# Patient Record
Sex: FEMALE | Race: WHITE | NOT HISPANIC OR LATINO | URBAN - METROPOLITAN AREA
[De-identification: names, ages, dates, MRNs, and addresses within clinical notes are randomized per-mention and may not be internally consistent; named-entity substitution may affect disease eponyms.]

---

## 2017-05-18 ENCOUNTER — IMPORTED ENCOUNTER (OUTPATIENT)
Dept: URBAN - METROPOLITAN AREA CLINIC 1 | Facility: CLINIC | Age: 72
End: 2017-05-18

## 2017-05-18 PROBLEM — H43.813: Noted: 2017-05-18

## 2017-05-18 PROBLEM — H31.092: Noted: 2017-05-18

## 2017-05-18 PROBLEM — H16.143: Noted: 2017-05-18

## 2017-05-18 PROBLEM — H25.813: Noted: 2017-05-18

## 2017-05-18 PROBLEM — H04.123: Noted: 2017-05-18

## 2017-05-18 PROCEDURE — 92014 COMPRE OPH EXAM EST PT 1/>: CPT

## 2017-05-18 NOTE — PATIENT DISCUSSION
1.  Cataract OU: Observe for now without intervention. The patient was advised to contact us if any change or worsening of vision2. LOREE w/ PEK OU- Cont ATs TID OU routinely. 3.  PVD OU - RD precautions. 4.  Chorioretinal Scar OS - stable observe 5. H/o LASIK OD (Possibly by Dr. Dax Ruiz?) Will again sign records release today prior to leaving (did not receive records from Dr. Dax Ruiz)  Letter to [address] for an appointment in 1 year 27 with Dr. Rose Daigle.

## 2018-05-21 ENCOUNTER — IMPORTED ENCOUNTER (OUTPATIENT)
Dept: URBAN - METROPOLITAN AREA CLINIC 1 | Facility: CLINIC | Age: 73
End: 2018-05-21

## 2018-05-21 PROBLEM — H21.89: Noted: 2018-05-21

## 2018-05-21 PROBLEM — H25.813: Noted: 2018-05-21

## 2018-05-21 PROCEDURE — 92250 FUNDUS PHOTOGRAPHY W/I&R: CPT

## 2018-05-21 PROCEDURE — 92014 COMPRE OPH EXAM EST PT 1/>: CPT

## 2018-05-21 NOTE — PATIENT DISCUSSION
1.  Cataract OU: Observe for now without intervention. The patient was advised to contact us if any change or worsening of vision2. Iris Nevus OD- External Photo today shows Iris Nevus OD. Will check patient every 6 months due to Iris Nevus. 3.  LOREE w/ PEK OU- Cont ATs TID OU routinely. 4.  PVD OU - RD precautions. 5.  Chorioretinal Scar OS - stable observe 6. H/o LASIK OD (Possibly by Dr. Georgina Montanez?) Will again sign records release today prior to leaving (did not receive records from Dr. Georgina Montanez again this visit) Would like to try and obtain old ocular records regarding previous LASIK OD. Letter to [address] for an appointment in 6 mo 10 (check Nevus OD) with Dr. Maura Jain.

## 2018-07-24 ENCOUNTER — IMPORTED ENCOUNTER (OUTPATIENT)
Dept: URBAN - METROPOLITAN AREA CLINIC 1 | Facility: CLINIC | Age: 73
End: 2018-07-24

## 2018-07-24 PROCEDURE — 92012 INTRM OPH EXAM EST PATIENT: CPT

## 2018-07-24 NOTE — PATIENT DISCUSSION
1.  Iris Nevus OD- Comparing to today's slitlamp to last exam's anterior photos I do not see any signs of progression. Reassurance2. Return for an appointment for Return as scheduled with Dr. Claudeen Cobble.

## 2018-12-13 ENCOUNTER — IMPORTED ENCOUNTER (OUTPATIENT)
Dept: URBAN - METROPOLITAN AREA CLINIC 1 | Facility: CLINIC | Age: 73
End: 2018-12-13

## 2018-12-13 PROBLEM — H16.143: Noted: 2018-12-13

## 2018-12-13 PROBLEM — H04.122: Noted: 2018-12-13

## 2018-12-13 PROBLEM — H04.123: Noted: 2018-12-13

## 2018-12-13 PROBLEM — H43.813: Noted: 2018-12-13

## 2018-12-13 PROBLEM — H16.142: Noted: 2018-12-13

## 2018-12-13 PROBLEM — H31.092: Noted: 2018-12-13

## 2018-12-13 PROBLEM — H25.813: Noted: 2018-12-13

## 2018-12-13 PROCEDURE — 92015 DETERMINE REFRACTIVE STATE: CPT

## 2018-12-13 PROCEDURE — 99213 OFFICE O/P EST LOW 20 MIN: CPT

## 2018-12-13 NOTE — PATIENT DISCUSSION
1.  Cataract OU: Observe for now without intervention. The patient was advised to contact us if any change or worsening of vision2. LOREE w/ PEK OU- Stable. The continuation of artificial tears were recommended. 3.  PVD OU- Old stable. 4.  Chorioretinal Scars OS- Stable. Observe. 5. Iris Nevus OD- Stable. Observe. 6. H/o LASIK OD (Possibly by Dr. Jorge Sandhu?) - unable to obtain old ocular records regarding previous LASIK OD. 7.  Return for an appointment for 30/glare in 6 months with Dr. Zachariah Das.

## 2019-06-13 ENCOUNTER — IMPORTED ENCOUNTER (OUTPATIENT)
Dept: URBAN - METROPOLITAN AREA CLINIC 1 | Facility: CLINIC | Age: 74
End: 2019-06-13

## 2019-06-13 PROBLEM — H16.143: Noted: 2019-06-13

## 2019-06-13 PROBLEM — H43.813: Noted: 2019-06-13

## 2019-06-13 PROBLEM — H04.123: Noted: 2019-06-13

## 2019-06-13 PROBLEM — H25.813: Noted: 2019-06-13

## 2019-06-13 PROCEDURE — 92014 COMPRE OPH EXAM EST PT 1/>: CPT

## 2019-06-13 NOTE — PATIENT DISCUSSION
1.  Cataract OU -- Visually significant secondary to glare OU however no va complaint from patient. Continue to observe for now without intervention. The patient was advised to contact us if any change or worsening of vision2. LOREE w/ inceased PEK OU -- Progression. Stressed compliance with ATs. Recommend ATs BID to TID OU. 3. PVD OU -- Old stable. Rd precautions4. Chorioretinal Scars OS- Stable. Observe. 5. Iris Nevus OD- Stable. Observe. 6. H/o LASIK OD (Possibly by Dr. Luis Rosario?) - unable to obtain old ocular records regarding previous LASIK OD. Patient defers Glasses MRx today. Return for an appointment in 1 year for a 30 glare with Dr. Jaciel Sloan.

## 2019-09-09 ENCOUNTER — IMPORTED ENCOUNTER (OUTPATIENT)
Dept: URBAN - METROPOLITAN AREA CLINIC 1 | Facility: CLINIC | Age: 74
End: 2019-09-09

## 2019-09-09 PROBLEM — H16.143: Noted: 2019-09-09

## 2019-09-09 PROBLEM — H25.813: Noted: 2019-09-09

## 2019-09-09 PROBLEM — H04.123: Noted: 2019-09-09

## 2019-09-09 PROCEDURE — 92012 INTRM OPH EXAM EST PATIENT: CPT

## 2019-09-09 PROCEDURE — 92015 DETERMINE REFRACTIVE STATE: CPT

## 2019-09-09 NOTE — PATIENT DISCUSSION
1.  Cataract OU:  Visually Significant secondary to glare discussed the risks benefits alternatives and limitations of cataract surgery. The patient stated a full understanding and a desire to proceed with the procedure. The patient will need to return for preop appointment with cataract measurements and to have any additional questions answered and start pre-operative eye drops as directed. Phaco PCL OS then OD. (Otherwise follow-up 6 mo 10 dfe glare) 2. LOREE w/ inceased PEK OU -- Cont ATs TID OU. 3. PVD OU -- stable. RD precautions4. Chorioretinal Scars OS- Stable. Observe. 5. Iris Nevus OD- Stable. Observe. 6. H/o LASIK OD (Possibly by Dr. Elías Rodriguez?) Myopic LASIK? - unable to obtain old ocular records regarding previous LASIK OD. Schedule Phaco PCL OS then OD.

## 2019-09-10 ENCOUNTER — IMPORTED ENCOUNTER (OUTPATIENT)
Dept: URBAN - METROPOLITAN AREA CLINIC 1 | Facility: CLINIC | Age: 74
End: 2019-09-10

## 2019-09-10 PROBLEM — H25.812: Noted: 2019-09-10

## 2019-09-10 PROCEDURE — 92136 OPHTHALMIC BIOMETRY: CPT

## 2019-09-10 NOTE — PATIENT DISCUSSION
1. Cataract OS:  Visually Significant secondary to glare discussed the risks benefits alternatives and limitations of cataract surgery. The patient stated a full understanding and a desire to proceed with the procedure. Discussed with patient if PO Gtts are more than $120 for all three combined when filling at their Pharmacy please call our office to request generic substitutions. Guarded visual prognosis due to history of refractive surgery. The accuracy of the IOL selection may be affected by prior refractive surgery and patient should expect to wear glasses after Phaco. Thoroughly discussed with patient patient understood. Phaco PCL OS  Lifestyle Questionnaire Completed. Return for an appointment for Return as scheduled with Dr. Sophia Garber.

## 2019-09-18 ENCOUNTER — IMPORTED ENCOUNTER (OUTPATIENT)
Dept: URBAN - METROPOLITAN AREA CLINIC 1 | Facility: CLINIC | Age: 74
End: 2019-09-18

## 2019-09-19 ENCOUNTER — IMPORTED ENCOUNTER (OUTPATIENT)
Dept: URBAN - METROPOLITAN AREA CLINIC 1 | Facility: CLINIC | Age: 74
End: 2019-09-19

## 2019-09-19 PROBLEM — Z96.1: Noted: 2019-09-19

## 2019-09-19 PROCEDURE — 99024 POSTOP FOLLOW-UP VISIT: CPT

## 2019-09-19 NOTE — PATIENT DISCUSSION
POD#1 CE/IOL OS (Standard w/ LenSx) doing well. Use Lotemax BID OS Bromsite Qdaily OS Ocuflox TID OS : Use all three gtts through completion of PO gtt chart regimen/ Per our instructions given to patient.   Post op Warnings Reiterated RTC as scheduled

## 2019-09-26 ENCOUNTER — IMPORTED ENCOUNTER (OUTPATIENT)
Dept: URBAN - METROPOLITAN AREA CLINIC 1 | Facility: CLINIC | Age: 74
End: 2019-09-26

## 2019-09-26 PROBLEM — H25.811: Noted: 2019-09-26

## 2019-09-26 PROCEDURE — 92136 OPHTHALMIC BIOMETRY: CPT

## 2019-09-26 NOTE — PATIENT DISCUSSION
1.  Cataract OD: Visually Significant secondary to glare discussed the risks benefits alternatives and limitations of cataract surgery. The patient stated a full understanding and a desire to proceed with the procedure. Discussed with patient if PO Gtts are more than $120 for all three combined when filling at their Pharmacy please call our office to request generic substitutions. Guarded visual prognosis due to history of refractive surgery. The accuracy of the IOL selection may be affected by prior refractive surgery and patient should expect to wear glasses after Phaco. Thoroughly discussed with patient patient understood. Phaco PCL OD 2. POW#1  CE/IOL OS (Standard w/ LenSx)doing well. Use Lotemax BID OS and Bromsite Qdaily OS: Use gtts through completion of PO gtt regimen.  F/u as scheduled 2nd eye

## 2019-10-02 ENCOUNTER — IMPORTED ENCOUNTER (OUTPATIENT)
Dept: URBAN - METROPOLITAN AREA CLINIC 1 | Facility: CLINIC | Age: 74
End: 2019-10-02

## 2019-10-03 ENCOUNTER — IMPORTED ENCOUNTER (OUTPATIENT)
Dept: URBAN - METROPOLITAN AREA CLINIC 1 | Facility: CLINIC | Age: 74
End: 2019-10-03

## 2019-10-03 PROBLEM — Z96.1: Noted: 2019-10-03

## 2019-10-03 PROCEDURE — 99024 POSTOP FOLLOW-UP VISIT: CPT

## 2019-10-03 NOTE — PATIENT DISCUSSION
POD#1 CE/IOL OD doing well. Use **: Use all three gtts through completion of PO gtt chart regimen/ Per our instructions given to patient.   Post op Warnings Reiterated RTC as scheduled

## 2019-10-03 NOTE — PATIENT DISCUSSION
1. POD#1 Phaco/ PCL OD (Standard w/LenSx)- doing well. Use Lotemax BID OD Bromsite Qdaily OD Ocuflox TID OD : Use all three gtts through completion of PO gtt chart regimen/ Per our instructions given. Post op Warnings Reiterated 2. POW#3 Phaco/ PCL OS(Standard w/LenSx)- doing well  Use Lotemax BID OS and Bromsite Qdaily OS: Use gtts through completion of PO gtt regimen.  RTC as scheduled

## 2019-10-24 ENCOUNTER — IMPORTED ENCOUNTER (OUTPATIENT)
Dept: URBAN - METROPOLITAN AREA CLINIC 1 | Facility: CLINIC | Age: 74
End: 2019-10-24

## 2019-10-24 PROBLEM — Z96.1: Noted: 2019-10-24

## 2019-10-24 PROCEDURE — 99024 POSTOP FOLLOW-UP VISIT: CPT

## 2019-10-24 NOTE — PATIENT DISCUSSION
POM#1 CE/IOL OU (Standard w/ LenSx OU) doing well. Use Lotemax BID OD and Bromsite Qdaily OD: Use gtts through completion of PO gtt regimen. MRX for glasses given. Return for an appointment in June 30 with Dr. Sofia Baumann.

## 2020-06-11 ENCOUNTER — IMPORTED ENCOUNTER (OUTPATIENT)
Dept: URBAN - METROPOLITAN AREA CLINIC 1 | Facility: CLINIC | Age: 75
End: 2020-06-11

## 2020-06-11 PROCEDURE — 92014 COMPRE OPH EXAM EST PT 1/>: CPT

## 2020-06-11 NOTE — PATIENT DISCUSSION
1.  LOREE w/ PEK OU -- Recommend the routine use of ATs QID OU. 2.  PCO OU -- (Posterior Capsule Opacification) Observe and consider yag cap when pt feels pco visually significant and visual acuity decreases to appropriate level. 3. Pseudophakia OU - Standard w/ LenSx OU. 4. Chorioretinal Scars OS - Stable. Observe. 5. PVD OU - RD precautions. 6.  Iris Nevus OD - Stable. 7. H/o LASIK OD (?[place]) unable to obtain old ocular records regarding previous LASIK. Return for an appointment in 1 year for a 30/glare with Dr. Luli Velasco.

## 2020-07-17 PROBLEM — Z96.1: Noted: 2020-07-17

## 2020-07-17 PROBLEM — H43.813: Noted: 2020-07-17

## 2020-07-17 PROBLEM — H26.493: Noted: 2020-07-17

## 2020-07-17 PROBLEM — H04.123: Noted: 2020-06-11

## 2020-07-17 PROBLEM — H16.143: Noted: 2020-06-11

## 2020-07-17 PROBLEM — H31.092: Noted: 2020-07-17

## 2021-06-11 ENCOUNTER — IMPORTED ENCOUNTER (OUTPATIENT)
Dept: URBAN - METROPOLITAN AREA CLINIC 1 | Facility: CLINIC | Age: 76
End: 2021-06-11

## 2021-06-11 PROBLEM — H16.143: Noted: 2021-06-11

## 2021-06-11 PROBLEM — Z96.1: Noted: 2021-06-11

## 2021-06-11 PROBLEM — H04.123: Noted: 2021-06-11

## 2021-06-11 PROBLEM — H26.493: Noted: 2021-06-11

## 2021-06-11 PROCEDURE — 99214 OFFICE O/P EST MOD 30 MIN: CPT

## 2021-06-11 NOTE — PATIENT DISCUSSION
1.  LOREE w/ PEK OU- Recommend increase ATs QID OU routinely 2. PCO OU: (Posterior Capsule Opacification)   Observe and consider yag cap when pt feels pco visually significant and visual acuity decreases to appropriate level. 3. Pseudophakia OU - (Standard w/ LenSx OU)4. Chorioretinal Scars OS - Stable. Observe. 5. PVD OU - RD precautions. 6.  Iris Nevus OD - Stable. 7. H/o LASIK OD (?[place]) unable to obtain old ocular records regarding previous LASIK. Patient deferred Man[facility] Rx today. Return for an appointment in 1 year 30/glare with Dr. Sophia Garber.

## 2022-03-18 PROBLEM — N94.9 GENITAL LESION, FEMALE: Status: ACTIVE | Noted: 2017-03-08

## 2022-03-18 PROBLEM — R31.29 OTHER MICROSCOPIC HEMATURIA: Status: ACTIVE | Noted: 2019-01-08

## 2022-03-19 PROBLEM — M85.9 DISORDER OF BONE DENSITY AND STRUCTURE, UNSPECIFIED: Status: ACTIVE | Noted: 2019-01-08

## 2022-03-19 PROBLEM — E87.5 HYPERPOTASSEMIA: Status: ACTIVE | Noted: 2019-01-08

## 2022-03-19 PROBLEM — R42 DIZZINESS AND GIDDINESS: Status: ACTIVE | Noted: 2019-01-08

## 2022-03-19 PROBLEM — R10.2 VAGINAL PAIN: Status: ACTIVE | Noted: 2017-03-08

## 2022-03-19 PROBLEM — N64.4 MASTODYNIA: Status: ACTIVE | Noted: 2017-10-16

## 2022-03-19 PROBLEM — E83.52 HYPERCALCEMIA: Status: ACTIVE | Noted: 2019-01-08

## 2022-03-19 PROBLEM — I36.9 NONRHEUMATIC TRICUSPID VALVE DISORDER: Status: ACTIVE | Noted: 2019-01-08

## 2022-03-19 PROBLEM — E66.3 OVERWEIGHT: Status: ACTIVE | Noted: 2019-01-08

## 2022-03-19 PROBLEM — R51.9 HEADACHE: Status: ACTIVE | Noted: 2019-01-08

## 2022-03-20 PROBLEM — R35.0 URINARY FREQUENCY: Status: ACTIVE | Noted: 2017-03-08

## 2022-03-20 PROBLEM — Z01.118 ENCOUNTER FOR EXAMINATION OF EARS AND HEARING WITH OTHER ABNORMAL FINDINGS: Status: ACTIVE | Noted: 2019-01-08

## 2022-04-02 ASSESSMENT — KERATOMETRY
OS_AXISANGLE_DEGREES: 007
OD_AXISANGLE2_DEGREES: 096
OS_AXISANGLE_DEGREES: 011
OS_K2POWER_DIOPTERS: 47.25
OD_K2POWER_DIOPTERS: 45.50
OD_K1POWER_DIOPTERS: 45.00
OS_AXISANGLE2_DEGREES: 101
OS_K2POWER_DIOPTERS: 47.50
OD_AXISANGLE2_DEGREES: 095
OS_K1POWER_DIOPTERS: 46.50
OD_AXISANGLE_DEGREES: 006
OD_K2POWER_DIOPTERS: 45.75
OD_K1POWER_DIOPTERS: 44.75
OS_AXISANGLE2_DEGREES: 097
OD_AXISANGLE_DEGREES: 005
OS_K1POWER_DIOPTERS: 46.75

## 2022-04-02 ASSESSMENT — VISUAL ACUITY
OS_SC: J1
OD_CC: 20/20
OS_CC: 20/20-2
OS_CC: 20/40
OS_CC: 20/20-1
OS_GLARE: 20/150
OD_CC: 20/20
OS_CC: 20/40
OD_CC: 20/20
OS_CC: 20/40
OD_SC: 20/25+1
OS_SC: J1
OD_CC: 20/20
OD_CC: 20/25+1
OS_CC: 20/20
OD_CC: 20/20
OS_GLARE: 20/150
OD_CC: 20/20
OD_GLARE: 20/150
OD_GLARE: 20/150
OS_CC: 20/40
OS_GLARE: 20/80
OS_SC: J1
OD_GLARE: 20/80
OD_CC: 20/20
OD_CC: 20/25
OD_GLARE: 20/150
OS_GLARE: 20/30
OS_CC: 20/20-1
OD_GLARE: 20/30
OS_CC: 20/30-2
OD_CC: 20/20
OS_CC: 20/20
OS_CC: 20/25+2
OS_SC: 20/25+1
OS_SC: J1
OS_GLARE: 20/150
OS_CC: 20/40

## 2022-04-02 ASSESSMENT — TONOMETRY
OS_IOP_MMHG: 18
OS_IOP_MMHG: 14
OD_IOP_MMHG: 13
OS_IOP_MMHG: 19
OD_IOP_MMHG: 19
OS_IOP_MMHG: 13
OS_IOP_MMHG: 17
OD_IOP_MMHG: 14
OD_IOP_MMHG: 18
OD_IOP_MMHG: 17
OS_IOP_MMHG: 13
OD_IOP_MMHG: 14
OS_IOP_MMHG: 18
OD_IOP_MMHG: 18
OS_IOP_MMHG: 16
OS_IOP_MMHG: 18
OD_IOP_MMHG: 16
OD_IOP_MMHG: 13
OS_IOP_MMHG: 13
OS_IOP_MMHG: 13
OD_IOP_MMHG: 17
OS_IOP_MMHG: 13

## 2022-12-12 ENCOUNTER — COMPREHENSIVE EXAM (OUTPATIENT)
Dept: URBAN - METROPOLITAN AREA CLINIC 1 | Facility: CLINIC | Age: 77
End: 2022-12-12

## 2022-12-12 PROCEDURE — 92015 DETERMINE REFRACTIVE STATE: CPT

## 2022-12-12 PROCEDURE — 99214 OFFICE O/P EST MOD 30 MIN: CPT

## 2022-12-12 ASSESSMENT — KERATOMETRY
OS_K2POWER_DIOPTERS: 45.50
OS_AXISANGLE_DEGREES: 075
OS_K1POWER_DIOPTERS: 43.75
OD_AXISANGLE_DEGREES: 045
OD_K1POWER_DIOPTERS: 44.75
OD_K2POWER_DIOPTERS: 45.25
OD_AXISANGLE2_DEGREES: 135
OS_AXISANGLE2_DEGREES: 165

## 2022-12-12 ASSESSMENT — VISUAL ACUITY
OD_SC: 20/30
OD_BAT: 20/60
OS_SC: 20/30
OS_BAT: 20/60

## 2022-12-12 ASSESSMENT — TONOMETRY
OS_IOP_MMHG: 18
OD_IOP_MMHG: 18

## 2023-01-20 ENCOUNTER — CLINIC PROCEDURE ONLY (OUTPATIENT)
Dept: URBAN - METROPOLITAN AREA CLINIC 1 | Facility: CLINIC | Age: 78
End: 2023-01-20

## 2023-01-20 DIAGNOSIS — Z96.1: ICD-10-CM

## 2023-01-20 DIAGNOSIS — H26.493: ICD-10-CM

## 2023-01-20 PROCEDURE — 66821 AFTER CATARACT LASER SURGERY: CPT

## 2023-01-20 ASSESSMENT — KERATOMETRY
OS_AXISANGLE_DEGREES: 075
OS_K2POWER_DIOPTERS: 45.50
OS_AXISANGLE2_DEGREES: 165
OS_K1POWER_DIOPTERS: 43.75
OD_AXISANGLE_DEGREES: 045
OD_K2POWER_DIOPTERS: 45.25
OD_AXISANGLE2_DEGREES: 135
OD_K1POWER_DIOPTERS: 44.75

## 2023-01-20 NOTE — PROCEDURE NOTE: CLINICAL
PROCEDURE NOTE: YAG Capsulotomy OD. Diagnosis: Posterior Capsular Opacity. Anesthesia: Topical. The purpose and nature of the procedure, possible alternative methods of treatment, the risks involved and the possibility of complications were discussed with patient. The Patient wishes to proceed and the consent was signed. 1 gtt Prolensa applied. The laser was then performed under topical anesthesia with no complications. Post op instructions were given to patient as well as a follow-up appointment. Patient was advised to call our office if any questions or concerns. Sophia Christiansen

## 2023-06-08 PROBLEM — R53.83 OTHER MALAISE AND FATIGUE: Status: ACTIVE | Noted: 2019-01-08

## 2023-06-08 PROBLEM — R09.89 OTHER DYSPNEA AND RESPIRATORY ABNORMALITY: Status: ACTIVE | Noted: 2019-01-08

## 2023-06-08 PROBLEM — R06.09 OTHER DYSPNEA AND RESPIRATORY ABNORMALITY: Status: ACTIVE | Noted: 2019-01-08

## 2023-06-08 PROBLEM — R53.81 OTHER MALAISE AND FATIGUE: Status: ACTIVE | Noted: 2019-01-08

## 2024-02-29 ENCOUNTER — COMPREHENSIVE EXAM (OUTPATIENT)
Dept: URBAN - METROPOLITAN AREA CLINIC 1 | Facility: CLINIC | Age: 79
End: 2024-02-29

## 2024-02-29 DIAGNOSIS — H04.123: ICD-10-CM

## 2024-02-29 DIAGNOSIS — H16.143: ICD-10-CM

## 2024-02-29 PROCEDURE — 99214 OFFICE O/P EST MOD 30 MIN: CPT

## 2024-02-29 ASSESSMENT — VISUAL ACUITY
OS_SC: 20/25
OD_SC: 20/20

## 2024-02-29 ASSESSMENT — TONOMETRY
OD_IOP_MMHG: 13
OS_IOP_MMHG: 12

## 2024-06-20 ENCOUNTER — EMERGENCY VISIT (OUTPATIENT)
Dept: URBAN - METROPOLITAN AREA CLINIC 1 | Facility: CLINIC | Age: 79
End: 2024-06-20

## 2024-06-20 DIAGNOSIS — H16.143: ICD-10-CM

## 2024-06-20 DIAGNOSIS — H04.123: ICD-10-CM

## 2024-06-20 PROCEDURE — 99213 OFFICE O/P EST LOW 20 MIN: CPT

## 2024-06-20 ASSESSMENT — VISUAL ACUITY
OS_SC: 20/25
OD_SC: 20/20

## 2024-06-20 ASSESSMENT — TONOMETRY
OD_IOP_MMHG: 13
OS_IOP_MMHG: 13

## 2025-03-14 ENCOUNTER — COMPREHENSIVE EXAM (OUTPATIENT)
Age: 80
End: 2025-03-14

## 2025-03-14 DIAGNOSIS — H04.123: ICD-10-CM

## 2025-03-14 DIAGNOSIS — Z96.1: ICD-10-CM

## 2025-03-14 DIAGNOSIS — H17.12: ICD-10-CM

## 2025-03-14 DIAGNOSIS — Z98.890: ICD-10-CM

## 2025-03-14 DIAGNOSIS — D31.91: ICD-10-CM

## 2025-03-14 DIAGNOSIS — H16.143: ICD-10-CM

## 2025-03-14 DIAGNOSIS — H43.813: ICD-10-CM

## 2025-03-14 PROCEDURE — 99214 OFFICE O/P EST MOD 30 MIN: CPT
